# Patient Record
Sex: MALE | Race: WHITE
[De-identification: names, ages, dates, MRNs, and addresses within clinical notes are randomized per-mention and may not be internally consistent; named-entity substitution may affect disease eponyms.]

---

## 2017-12-18 ENCOUNTER — HOSPITAL ENCOUNTER (OUTPATIENT)
Dept: HOSPITAL 47 - RADFLMAIN | Age: 57
Discharge: HOME | End: 2017-12-18
Payer: COMMERCIAL

## 2017-12-18 DIAGNOSIS — R13.12: Primary | ICD-10-CM

## 2017-12-18 PROCEDURE — 74230 X-RAY XM SWLNG FUNCJ C+: CPT

## 2017-12-18 NOTE — FL
EXAMINATION TYPE: FL barium swallow w video

 

DATE OF EXAM: 12/18/2017

 

MODIFIED SWALLOW / DEGLUTITION STUDY

 

CLINICAL HISTORY: Dysphagia. History of intracranial hemorrhage requiring long-term intubation.

 

TECHNIQUE:  Deglutition study is performed utilizing thin liquid barium, honey and nectar thick liqui
d barium, barium thick applesauce, and barium coated cracker. A total of 79 seconds of fluoroscopic t
robyn was utilized during procedure. Approximately 8-10 cine sequences were obtained. 0 images are save
d to PACS system.

 

COMPARISON: None.

 

FINDINGS: The oral and pharyngeal phases show satisfactory initiation and propagation with all modali
ties tested.  Normal mastication is seen with solid modalities tested. Tracheostomy tube is noted. Th
ere is aspiration which initiate a cough reflex with thin liquid barium through straw. There is no ev
idence of penetration or aspiration with any other modality tested including normal drinking of thin 
liquid barium.  No significant pharyngeal residue was appreciated.

 

IMPRESSION: Aspiration which initiate cough reflex with thin liquid barium through a straw otherwise 
unremarkable study.  Please refer to speech therapist notes for further details if necessary.

## 2018-09-13 ENCOUNTER — HOSPITAL ENCOUNTER (OUTPATIENT)
Dept: HOSPITAL 47 - NEUROMAIN | Age: 58
Discharge: HOME | End: 2018-09-13
Attending: PSYCHIATRY & NEUROLOGY
Payer: COMMERCIAL

## 2018-09-13 DIAGNOSIS — H81.22: Primary | ICD-10-CM

## 2018-09-13 DIAGNOSIS — R90.89: ICD-10-CM

## 2018-09-13 DIAGNOSIS — I63.40: ICD-10-CM

## 2018-09-13 PROCEDURE — 92537 CALORIC VSTBLR TEST W/REC: CPT

## 2018-09-13 PROCEDURE — 70498 CT ANGIOGRAPHY NECK: CPT

## 2018-09-13 PROCEDURE — 92540 BASIC VESTIBULAR EVALUATION: CPT

## 2018-09-13 NOTE — CT
EXAMINATION TYPE: CT angio neck

 

DATE OF EXAM: 9/13/2018

 

COMPARISON: None

 

HISTORY: 58-year-old male with vertigo, left-sided weakness, CVA

 

TECHNIQUE: Contiguous axial scanning of the neck performed with IV Contrast, patient injected with 12
5 mL of Isovue 370. Coronal/sagittal MIP reconstructions performed. 3-D reconstructions generated on 
a dedicated independent workstation.

 

CT DLP: 731.3 mGycm

Automated exposure control for dose reduction was used.

 

FINDINGS: 

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 Borderline sized caliber to the visualized right main pulmon
emeli artery and 2.5 cm may reflect underlying pulmonary arterial hypertension.

 

Conventional arch vessel branching anatomy.

 

The right common and internal carotid arteries are patent.

 

The left common and internal carotid arteries are patent. No significant prostatic change at either b
ifurcation. There the bilateral vertebral arteries appear patent and codominant though the V4 segment
 left vertebral artery after the PICA takeoff appears hypoplastic.

 

Mild lingual tonsillar hypertrophy. Visualized upper lung shows dependent atelectasis and mild emphys
ema.

 

 

 

 

IMPRESSION: 

 

1. PATENT BILATERAL COMMON AND INTERNAL CAROTID ARTERIES. NO SIGNIFICANT ATHEROSCLEROTIC CHANGE SEEN.


2. THE V4 SEGMENT LEFT VERTEBRAL ARTERY BECOMES HYPOPLASTIC AFTER THE PICA TAKEOFF.

3. MILD ANEURYSM ASCENDING AORTA 4.1 CM.

## 2018-09-16 NOTE — EEG
ELECTROENCEPHALOGRAM REPORT



VNG INDICATIONS:

Persistent dizziness after a stroke.



VIDEO ELECTRONYSTAGMOGRAPHIC REPORT:



AGE:

58



VNG INDICATIONS:

Persistent dizziness after a stroke.



VNG FINDINGS:



SPONTANEOUS NYSTAGMUS:

@@



SACCADES:

Saccades shows impaired accuracies, peak velocities and latencies.



GAZE TEST:



SINUSOIDAL TRACKING:

Tracking shows significant break-ups.



OKN TEST:

Optokinetic nystagmus.  No results were able to be obtained due to the patient having

difficulty following with any eye movement.



MINDY-HALLPIKE TEST:

Morris-Hallpike maneuver negative.



POSITION TEST:

Static position testing shows no nystagmus in seated, supine, head right and head left

positions with eyes opened and also with vision denied.



CALORIC TEST:

Caloric testing shows a 20% weakness in the left ear.  There is left abnormal looking

asymmetry.



IMPRESSION:

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

abnormalities with central nervous system dysfunction with impaired saccades and

tracking.  Clinical correlation is advised.





ANNE MARIE / LANDENN: [id number] / Job#: [id number]